# Patient Record
Sex: FEMALE | Race: WHITE | URBAN - METROPOLITAN AREA
[De-identification: names, ages, dates, MRNs, and addresses within clinical notes are randomized per-mention and may not be internally consistent; named-entity substitution may affect disease eponyms.]

---

## 2018-09-24 ENCOUNTER — HISTORICAL (OUTPATIENT)
Dept: INTENSIVE CARE | Facility: HOSPITAL | Age: 68
End: 2018-09-24

## 2019-10-17 ENCOUNTER — HISTORICAL (OUTPATIENT)
Dept: ADMINISTRATIVE | Facility: HOSPITAL | Age: 69
End: 2019-10-17

## 2020-09-14 ENCOUNTER — HISTORICAL (OUTPATIENT)
Dept: ADMINISTRATIVE | Facility: HOSPITAL | Age: 70
End: 2020-09-14

## 2021-04-29 ENCOUNTER — HISTORICAL (OUTPATIENT)
Dept: ADMINISTRATIVE | Facility: HOSPITAL | Age: 71
End: 2021-04-29

## 2021-06-21 ENCOUNTER — HISTORICAL (OUTPATIENT)
Dept: ADMINISTRATIVE | Facility: HOSPITAL | Age: 71
End: 2021-06-21

## 2021-06-21 LAB
ABS NEUT (OLG): 2.39 X10(3)/MCL (ref 2.1–9.2)
ALBUMIN SERPL-MCNC: 3.5 GM/DL (ref 3.4–4.8)
ALBUMIN/GLOB SERPL: 1.5 RATIO (ref 1.1–2)
ALP SERPL-CCNC: 65 UNIT/L (ref 40–150)
ALT SERPL-CCNC: 22 UNIT/L (ref 0–55)
AST SERPL-CCNC: 20 UNIT/L (ref 5–34)
BASOPHILS # BLD AUTO: 0 X10(3)/MCL (ref 0–0.2)
BASOPHILS NFR BLD AUTO: 1 %
BILIRUB SERPL-MCNC: 0.4 MG/DL
BILIRUBIN DIRECT+TOT PNL SERPL-MCNC: 0.2 MG/DL (ref 0–0.5)
BILIRUBIN DIRECT+TOT PNL SERPL-MCNC: 0.2 MG/DL (ref 0–0.8)
BUN SERPL-MCNC: 16.2 MG/DL (ref 9.8–20.1)
CALCIUM SERPL-MCNC: 8.9 MG/DL (ref 8.4–10.2)
CHLORIDE SERPL-SCNC: 106 MMOL/L (ref 98–107)
CO2 SERPL-SCNC: 26 MMOL/L (ref 23–31)
CREAT SERPL-MCNC: 0.74 MG/DL (ref 0.55–1.02)
EOSINOPHIL # BLD AUTO: 0.2 X10(3)/MCL (ref 0–0.9)
EOSINOPHIL NFR BLD AUTO: 3 %
ERYTHROCYTE [DISTWIDTH] IN BLOOD BY AUTOMATED COUNT: 13.9 % (ref 11.5–17)
GLOBULIN SER-MCNC: 2.3 GM/DL (ref 2.4–3.5)
GLUCOSE SERPL-MCNC: 87 MG/DL (ref 82–115)
HCT VFR BLD AUTO: 39.7 % (ref 37–47)
HGB BLD-MCNC: 12.7 GM/DL (ref 12–16)
LDH SERPL-CCNC: 252 UNIT/L (ref 140–271)
LYMPHOCYTES # BLD AUTO: 2 X10(3)/MCL (ref 0.6–4.6)
LYMPHOCYTES NFR BLD AUTO: 38 %
MCH RBC QN AUTO: 28.3 PG (ref 27–31)
MCHC RBC AUTO-ENTMCNC: 32 GM/DL (ref 33–36)
MCV RBC AUTO: 88.4 FL (ref 80–94)
MONOCYTES # BLD AUTO: 0.5 X10(3)/MCL (ref 0.1–1.3)
MONOCYTES NFR BLD AUTO: 10 %
NEUTROPHILS # BLD AUTO: 2.39 X10(3)/MCL (ref 2.1–9.2)
NEUTROPHILS NFR BLD AUTO: 47 %
PLATELET # BLD AUTO: 172 X10(3)/MCL (ref 130–400)
PMV BLD AUTO: 10.9 FL (ref 9.4–12.4)
POTASSIUM SERPL-SCNC: 4.6 MMOL/L (ref 3.5–5.1)
PROT SERPL-MCNC: 5.8 GM/DL (ref 5.8–7.6)
RBC # BLD AUTO: 4.49 X10(6)/MCL (ref 4.2–5.4)
SODIUM SERPL-SCNC: 142 MMOL/L (ref 136–145)
T3FREE SERPL-MCNC: 3.28 PG/ML (ref 1.58–3.91)
TSH SERPL-ACNC: 2.77 UIU/ML (ref 0.35–4.94)
WBC # SPEC AUTO: 5.1 X10(3)/MCL (ref 4.5–11.5)

## 2022-03-24 ENCOUNTER — HISTORICAL (OUTPATIENT)
Dept: ADMINISTRATIVE | Facility: HOSPITAL | Age: 72
End: 2022-03-24

## 2022-04-10 ENCOUNTER — HISTORICAL (OUTPATIENT)
Dept: ADMINISTRATIVE | Facility: HOSPITAL | Age: 72
End: 2022-04-10

## 2022-04-28 VITALS
WEIGHT: 190 LBS | SYSTOLIC BLOOD PRESSURE: 127 MMHG | HEIGHT: 64 IN | BODY MASS INDEX: 32.44 KG/M2 | DIASTOLIC BLOOD PRESSURE: 70 MMHG

## 2025-05-03 ENCOUNTER — OFFICE VISIT (OUTPATIENT)
Dept: URGENT CARE | Facility: CLINIC | Age: 75
End: 2025-05-03
Payer: MEDICARE

## 2025-05-03 VITALS
OXYGEN SATURATION: 100 % | RESPIRATION RATE: 18 BRPM | HEART RATE: 71 BPM | DIASTOLIC BLOOD PRESSURE: 74 MMHG | BODY MASS INDEX: 24.75 KG/M2 | WEIGHT: 145 LBS | SYSTOLIC BLOOD PRESSURE: 131 MMHG | HEIGHT: 64 IN | TEMPERATURE: 98 F

## 2025-05-03 DIAGNOSIS — R09.81 NASAL CONGESTION: ICD-10-CM

## 2025-05-03 DIAGNOSIS — J02.9 SORE THROAT: Primary | ICD-10-CM

## 2025-05-03 DIAGNOSIS — R09.82 POSTNASAL DRIP: ICD-10-CM

## 2025-05-03 LAB
CTP QC/QA: YES
MOLECULAR STREP A: NEGATIVE

## 2025-05-03 PROCEDURE — 87651 STREP A DNA AMP PROBE: CPT | Mod: QW,,, | Performed by: PHYSICIAN ASSISTANT

## 2025-05-03 PROCEDURE — 99203 OFFICE O/P NEW LOW 30 MIN: CPT | Mod: 25,,, | Performed by: PHYSICIAN ASSISTANT

## 2025-05-03 PROCEDURE — 96372 THER/PROPH/DIAG INJ SC/IM: CPT | Mod: ,,, | Performed by: PHYSICIAN ASSISTANT

## 2025-05-03 RX ORDER — CLONAZEPAM 1 MG/1
TABLET, ORALLY DISINTEGRATING ORAL
COMMUNITY

## 2025-05-03 RX ORDER — BETAMETHASONE SODIUM PHOSPHATE AND BETAMETHASONE ACETATE 3; 3 MG/ML; MG/ML
9 INJECTION, SUSPENSION INTRA-ARTICULAR; INTRALESIONAL; INTRAMUSCULAR; SOFT TISSUE
Status: COMPLETED | OUTPATIENT
Start: 2025-05-03 | End: 2025-05-03

## 2025-05-03 RX ORDER — ATENOLOL 25 MG/1
12.5 TABLET ORAL 2 TIMES DAILY
COMMUNITY

## 2025-05-03 RX ORDER — ASPIRIN 81 MG/1
81 TABLET ORAL
COMMUNITY

## 2025-05-03 RX ORDER — CELECOXIB 100 MG/1
100 CAPSULE ORAL
COMMUNITY

## 2025-05-03 RX ORDER — DULOXETIN HYDROCHLORIDE 60 MG/1
60 CAPSULE, DELAYED RELEASE ORAL 2 TIMES DAILY
COMMUNITY

## 2025-05-03 RX ORDER — HYOSCYAMINE SULFATE 0.38 MG/1
1 TABLET, EXTENDED RELEASE ORAL 2 TIMES DAILY
COMMUNITY

## 2025-05-03 RX ORDER — BISOPROLOL FUMARATE 5 MG/1
TABLET, FILM COATED ORAL
COMMUNITY

## 2025-05-03 RX ORDER — BUPROPION HYDROCHLORIDE 150 MG/1
150 TABLET ORAL
COMMUNITY

## 2025-05-03 RX ORDER — ROSUVASTATIN CALCIUM 40 MG/1
40 TABLET, COATED ORAL
COMMUNITY

## 2025-05-03 RX ORDER — ESOMEPRAZOLE MAGNESIUM 40 MG/1
40 CAPSULE, DELAYED RELEASE ORAL 2 TIMES DAILY
COMMUNITY

## 2025-05-03 RX ADMIN — BETAMETHASONE SODIUM PHOSPHATE AND BETAMETHASONE ACETATE 9 MG: 3; 3 INJECTION, SUSPENSION INTRA-ARTICULAR; INTRALESIONAL; INTRAMUSCULAR; SOFT TISSUE at 12:05

## 2025-05-03 NOTE — PATIENT INSTRUCTIONS
Concern for nasal allergies, nasal congestion postnasal drip.  Negative strep testing    Recommend alternate Tylenol and ibuprofen every 6 hours if needed for aches pains or discomfort.  Recommend saltwater gargles throat lozenge or Chloraseptic spray if needed for temporary sore throat relief.  Recommend daily non sedating antihistamine of choice Claritin Zyrtec loratadine Allegra or Xyzal next 2 weeks for mild-to-moderate nasal allergies nightly if needed for severe nasal allergies.  Recommend Sudafed or Coricidin decongestant over the next week if needed for nasal congestion sinus pressure and inflammation in addition to steroid injection received today.  May use 3 day limited Afrin or Rusty-Synephrine nasal spray if needed for nasal congestion inflammation.     Recommend follow-up with primary care physician in 2 weeks for re-evaluation if not improving.

## 2025-05-03 NOTE — PROGRESS NOTES
"Subjective:      Patient ID: Deana Ruiz is a 74 y.o. female.    Vitals:  height is 5' 3.78" (1.62 m) and weight is 65.8 kg (145 lb). Her temperature is 98.4 °F (36.9 °C). Her blood pressure is 131/74 and her pulse is 71. Her respiration is 18 and oxygen saturation is 100%.     Chief Complaint: Sore Throat    Female reports 4 days ago having nasal allergies with postnasal drip and sore throat taking Tylenol with mild pain relief presents to urgent Care for initial evaluation.  Patient denies fever, cough, body aches, nausea vomiting or diarrhea.     Patient is a 74 y.o. female who presents to urgent care with complaints of ST x4 days. Alleviating factors include tylenol with mild amount of relief. Patient denies nausea, vomiting, diarrhea, fever, cough, NC, BA.      Sore Throat   This is a new problem. There has been no fever. Associated symptoms include congestion. Pertinent negatives include no coughing, diarrhea or vomiting.     Constitution: Negative for chills, sweating, fatigue and fever.   HENT:  Positive for congestion, postnasal drip and sore throat.    Respiratory:  Negative for cough.    Gastrointestinal:  Negative for nausea, vomiting and diarrhea.   Musculoskeletal:  Negative for muscle ache.   Skin:  Negative for erythema.   Allergic/Immunologic: Positive for seasonal allergies.      Objective:     Physical Exam   Constitutional: She is oriented to person, place, and time. She appears well-developed. She is cooperative.  Non-toxic appearance.      Comments:Awake alert ambulatory nasally congested female speaks in complete sentences     HENT:   Head: Normocephalic and atraumatic.   Ears:   Right Ear: Hearing, tympanic membrane, external ear and ear canal normal. Tympanic membrane is not erythematous and not bulging. No middle ear effusion.   Left Ear: Hearing, tympanic membrane, external ear and ear canal normal. Tympanic membrane is not erythematous and not bulging.  No middle ear effusion. "   Nose: Mucosal edema and congestion present. No rhinorrhea, purulent discharge or nasal deformity. No epistaxis. Right sinus exhibits no maxillary sinus tenderness and no frontal sinus tenderness. Left sinus exhibits no maxillary sinus tenderness and no frontal sinus tenderness.   Mouth/Throat: Uvula is midline, oropharynx is clear and moist and mucous membranes are normal. Mucous membranes are moist. No trismus in the jaw. Normal dentition. No uvula swelling. No oropharyngeal exudate, posterior oropharyngeal edema or posterior oropharyngeal erythema.      Comments: No edema, no palate petechiae, no muffled voice  Eyes: Conjunctivae and lids are normal. No scleral icterus.   Neck: Trachea normal and phonation normal. Neck supple. No edema present. No erythema present. No neck rigidity present.   Cardiovascular: Normal rate, regular rhythm, normal heart sounds and normal pulses.   Pulmonary/Chest: Effort normal and breath sounds normal. No stridor. No respiratory distress. She has no decreased breath sounds. She has no wheezes. She has no rhonchi. She has no rales.         Comments: CTA bilaterally    Musculoskeletal: Normal range of motion.         General: No swelling. Normal range of motion.      Cervical back: She exhibits no tenderness.   Lymphadenopathy:     She has no cervical adenopathy.   Neurological: no focal deficit. She is alert and oriented to person, place, and time. She displays no weakness. She exhibits normal muscle tone. Coordination normal.   Skin: Skin is warm, dry, intact, not diaphoretic, not pale and no rash. No erythema   Psychiatric: Her speech is normal and behavior is normal. Judgment and thought content normal.   Nursing note and vitals reviewed.         Previous History      Review of patient's allergies indicates:   Allergen Reactions    Levofloxacin Other (See Comments)    Prochlorperazine Other (See Comments)     Other Reaction(s): could not talk    Meloxicam Hives and Itching  "      Past Medical History:   Diagnosis Date    Acid reflux     Anxiety     Depression     Fibromyalgia     Heartburn     High cholesterol     Skin cancer      Current Outpatient Medications   Medication Instructions    aspirin (ECOTRIN) 81 mg    atenoloL (TENORMIN) 12.5 mg, 2 times daily    bisoprolol (ZEBETA) 5 MG tablet     buPROPion (WELLBUTRIN XL) 150 mg    celecoxib (CELEBREX) 100 mg    clonazePAM (KLONOPIN) 1 MG disintegrating tablet Take by mouth.    DULoxetine (CYMBALTA) 60 mg, 2 times daily    esomeprazole (NEXIUM) 40 mg, 2 times daily    hyoscyamine (LEVBID) 0.375 mg Tb12 1 tablet, 2 times daily    rosuvastatin (CRESTOR) 40 mg, Oral     Past Surgical History:   Procedure Laterality Date    BACK SURGERY       SECTION      SINUS SURGERY      TONSILLECTOMY       Family History   Problem Relation Name Age of Onset    Alcohol abuse Mother      Heart failure Mother      Alcohol abuse Father      Drug abuse Father      Alcohol abuse Brother         Social History[1]     Physical Exam      Vital Signs Reviewed   /74   Pulse 71   Temp 98.4 °F (36.9 °C)   Resp 18   Ht 5' 3.78" (1.62 m)   Wt 65.8 kg (145 lb)   SpO2 100%   BMI 25.06 kg/m²        Procedures    Procedures     Labs     Results for orders placed or performed in visit on 25   POCT Strep A, Molecular    Collection Time: 25 11:57 AM   Result Value Ref Range    Molecular Strep A, POC Negative Negative     Acceptable Yes        Assessment:     1. Sore throat    2. Nasal congestion    3. Postnasal drip        Plan:   Patient understands negative strep testing though high concern for nasal allergies nasal congestion postnasal drip pharyngitis.  Patient desires steroid injection while in clinic today.  Patient understands discharge plan with symptomatic Rx antihistamine, decongestant and recommended follow-up.    Concern for nasal allergies, nasal congestion postnasal drip.  Negative strep testing    Recommend " alternate Tylenol and ibuprofen every 6 hours if needed for aches pains or discomfort.  Recommend saltwater gargles throat lozenge or Chloraseptic spray if needed for temporary sore throat relief.  Recommend daily non sedating antihistamine of choice Claritin Zyrtec loratadine Allegra or Xyzal next 2 weeks for mild-to-moderate nasal allergies nightly if needed for severe nasal allergies.  Recommend Sudafed or Coricidin decongestant over the next week if needed for nasal congestion sinus pressure and inflammation in addition to steroid injection received today.  May use 3 day limited Afrin or Rusty-Synephrine nasal spray if needed for nasal congestion inflammation.     Recommend follow-up with primary care physician in 2 weeks for re-evaluation if not improving.  Sore throat  -     POCT Strep A, Molecular    Nasal congestion    Postnasal drip    Other orders  -     betamethasone acetate-betamethasone sodium phosphate injection 9 mg                         [1]   Social History  Tobacco Use    Smoking status: Never    Smokeless tobacco: Never   Substance Use Topics    Alcohol use: Not Currently